# Patient Record
Sex: MALE | Race: WHITE | NOT HISPANIC OR LATINO | Employment: FULL TIME | URBAN - METROPOLITAN AREA
[De-identification: names, ages, dates, MRNs, and addresses within clinical notes are randomized per-mention and may not be internally consistent; named-entity substitution may affect disease eponyms.]

---

## 2017-07-29 ENCOUNTER — HOSPITAL ENCOUNTER (EMERGENCY)
Facility: HOSPITAL | Age: 22
Discharge: HOME | End: 2017-07-30
Attending: EMERGENCY MEDICINE | Admitting: EMERGENCY MEDICINE
Payer: COMMERCIAL

## 2017-07-29 DIAGNOSIS — R45.851 SUICIDAL THOUGHTS: ICD-10-CM

## 2017-07-29 DIAGNOSIS — F19.10 POLYSUBSTANCE ABUSE (HCC): Primary | ICD-10-CM

## 2017-07-29 PROCEDURE — 82075 ASSAY OF BREATH ETHANOL: CPT | Performed by: EMERGENCY MEDICINE

## 2017-07-30 VITALS
TEMPERATURE: 97.4 F | SYSTOLIC BLOOD PRESSURE: 141 MMHG | DIASTOLIC BLOOD PRESSURE: 69 MMHG | RESPIRATION RATE: 18 BRPM | OXYGEN SATURATION: 98 % | WEIGHT: 155 LBS | HEART RATE: 99 BPM

## 2017-07-30 LAB
AMPHETAMINES SERPL QL SCN: NEGATIVE
BARBITURATES UR QL: NEGATIVE
BENZODIAZ UR QL: NEGATIVE
COCAINE UR QL: NEGATIVE
ETHANOL EXG-MCNC: 0 MG/DL
METHADONE UR QL: NEGATIVE
OPIATES UR QL SCN: NEGATIVE
PCP UR QL: NEGATIVE
THC UR QL: NEGATIVE

## 2017-07-30 PROCEDURE — 99284 EMERGENCY DEPT VISIT MOD MDM: CPT

## 2017-07-30 PROCEDURE — 80307 DRUG TEST PRSMV CHEM ANLYZR: CPT | Performed by: EMERGENCY MEDICINE

## 2017-07-30 RX ORDER — NICOTINE 21 MG/24HR
21 PATCH, TRANSDERMAL 24 HOURS TRANSDERMAL ONCE
Status: DISCONTINUED | OUTPATIENT
Start: 2017-07-30 | End: 2017-07-30 | Stop reason: HOSPADM

## 2017-07-30 RX ADMIN — NICOTINE 21 MG: 21 PATCH, EXTENDED RELEASE TRANSDERMAL at 01:10

## 2017-08-21 ENCOUNTER — HOSPITAL ENCOUNTER (EMERGENCY)
Facility: HOSPITAL | Age: 22
Discharge: HOME | End: 2017-08-21
Attending: EMERGENCY MEDICINE
Payer: COMMERCIAL

## 2017-08-21 ENCOUNTER — APPOINTMENT (EMERGENCY)
Dept: RADIOLOGY | Facility: HOSPITAL | Age: 22
End: 2017-08-21
Payer: COMMERCIAL

## 2017-08-21 VITALS
RESPIRATION RATE: 18 BRPM | TEMPERATURE: 98.7 F | HEART RATE: 86 BPM | WEIGHT: 170 LBS | OXYGEN SATURATION: 100 % | SYSTOLIC BLOOD PRESSURE: 116 MMHG | DIASTOLIC BLOOD PRESSURE: 62 MMHG

## 2017-08-21 DIAGNOSIS — M25.569 KNEE PAIN: Primary | ICD-10-CM

## 2017-08-21 DIAGNOSIS — M25.519 SHOULDER PAIN: ICD-10-CM

## 2017-08-21 PROCEDURE — 96372 THER/PROPH/DIAG INJ SC/IM: CPT

## 2017-08-21 PROCEDURE — 90715 TDAP VACCINE 7 YRS/> IM: CPT | Performed by: EMERGENCY MEDICINE

## 2017-08-21 PROCEDURE — 99283 EMERGENCY DEPT VISIT LOW MDM: CPT

## 2017-08-21 PROCEDURE — 73030 X-RAY EXAM OF SHOULDER: CPT | Performed by: EMERGENCY MEDICINE

## 2017-08-21 PROCEDURE — 90471 IMMUNIZATION ADMIN: CPT

## 2017-08-21 PROCEDURE — 73564 X-RAY EXAM KNEE 4 OR MORE: CPT | Performed by: EMERGENCY MEDICINE

## 2017-08-21 RX ORDER — LAMOTRIGINE 100 MG/1
25 TABLET ORAL DAILY
COMMUNITY
End: 2017-08-21

## 2017-08-21 RX ORDER — KETOROLAC TROMETHAMINE 30 MG/ML
15 INJECTION, SOLUTION INTRAMUSCULAR; INTRAVENOUS ONCE
Status: COMPLETED | OUTPATIENT
Start: 2017-08-21 | End: 2017-08-21

## 2017-08-21 RX ORDER — ACETAMINOPHEN 160 MG/5ML
650 SUSPENSION, ORAL (FINAL DOSE FORM) ORAL ONCE
Status: DISCONTINUED | OUTPATIENT
Start: 2017-08-21 | End: 2017-08-21

## 2017-08-21 RX ORDER — ACETAMINOPHEN 325 MG/1
975 TABLET ORAL ONCE
Status: DISCONTINUED | OUTPATIENT
Start: 2017-08-21 | End: 2017-08-21

## 2017-08-21 RX ORDER — ACETAMINOPHEN 325 MG/1
650 TABLET ORAL ONCE
Status: COMPLETED | OUTPATIENT
Start: 2017-08-21 | End: 2017-08-21

## 2017-08-21 RX ORDER — LAMOTRIGINE 25 MG/1
25 TABLET ORAL
COMMUNITY
Start: 2017-08-01 | End: 2019-01-04

## 2017-08-21 RX ORDER — HYDROXYZINE HYDROCHLORIDE 25 MG/1
TABLET, FILM COATED ORAL
COMMUNITY
Start: 2017-08-01 | End: 2018-05-04 | Stop reason: ALTCHOICE

## 2017-08-21 RX ORDER — ACETAMINOPHEN 325 MG/1
TABLET ORAL
Status: COMPLETED
Start: 2017-08-21 | End: 2017-08-21

## 2017-08-21 RX ADMIN — TETANUS TOXOID, REDUCED DIPHTHERIA TOXOID AND ACELLULAR PERTUSSIS VACCINE, ADSORBED 0.5 ML: 5; 2.5; 8; 8; 2.5 SUSPENSION INTRAMUSCULAR at 21:59

## 2017-08-21 RX ADMIN — ACETAMINOPHEN 650 MG: 325 TABLET, FILM COATED ORAL at 20:55

## 2017-08-21 RX ADMIN — ACETAMINOPHEN 650 MG: 325 TABLET ORAL at 20:55

## 2017-08-21 RX ADMIN — KETOROLAC TROMETHAMINE 15 MG: 30 INJECTION, SOLUTION INTRAMUSCULAR at 22:02

## 2017-09-22 ENCOUNTER — HOSPITAL ENCOUNTER (EMERGENCY)
Facility: HOSPITAL | Age: 22
Discharge: HOME | End: 2017-09-22
Attending: EMERGENCY MEDICINE
Payer: COMMERCIAL

## 2017-09-22 VITALS
HEIGHT: 62 IN | HEART RATE: 81 BPM | RESPIRATION RATE: 18 BRPM | SYSTOLIC BLOOD PRESSURE: 131 MMHG | WEIGHT: 170 LBS | TEMPERATURE: 99 F | BODY MASS INDEX: 31.28 KG/M2 | OXYGEN SATURATION: 100 % | DIASTOLIC BLOOD PRESSURE: 68 MMHG

## 2017-09-22 DIAGNOSIS — S39.012A LUMBAR STRAIN, INITIAL ENCOUNTER: Primary | ICD-10-CM

## 2017-09-22 PROCEDURE — 99283 EMERGENCY DEPT VISIT LOW MDM: CPT

## 2017-09-22 RX ORDER — IBUPROFEN 400 MG/1
800 TABLET ORAL ONCE
Status: COMPLETED | OUTPATIENT
Start: 2017-09-22 | End: 2017-09-22

## 2017-09-22 RX ORDER — CYCLOBENZAPRINE HCL 10 MG
10 TABLET ORAL 3 TIMES DAILY
Qty: 30 TABLET | Refills: 0 | Status: SHIPPED | OUTPATIENT
Start: 2017-09-22 | End: 2018-05-04 | Stop reason: ALTCHOICE

## 2017-09-22 RX ADMIN — IBUPROFEN 800 MG: 400 TABLET ORAL at 02:09

## 2018-05-04 ENCOUNTER — APPOINTMENT (EMERGENCY)
Dept: RADIOLOGY | Facility: HOSPITAL | Age: 23
End: 2018-05-04
Payer: COMMERCIAL

## 2018-05-04 ENCOUNTER — HOSPITAL ENCOUNTER (EMERGENCY)
Facility: HOSPITAL | Age: 23
Discharge: HOME | End: 2018-05-04
Payer: COMMERCIAL

## 2018-05-04 VITALS
TEMPERATURE: 98 F | DIASTOLIC BLOOD PRESSURE: 66 MMHG | OXYGEN SATURATION: 97 % | RESPIRATION RATE: 18 BRPM | SYSTOLIC BLOOD PRESSURE: 125 MMHG | BODY MASS INDEX: 32.92 KG/M2 | HEART RATE: 75 BPM | WEIGHT: 180 LBS

## 2018-05-04 DIAGNOSIS — M77.42 METATARSALGIA OF LEFT FOOT: ICD-10-CM

## 2018-05-04 DIAGNOSIS — M72.2 PLANTAR FASCIITIS: Primary | ICD-10-CM

## 2018-05-04 LAB
ANION GAP BLD CALC-SCNC: 14 MMOL/L (ref 4–13)
BUN BLD-MCNC: 17 MG/DL (ref 5–25)
CA-I BLD-SCNC: 1.21 MMOL/L (ref 1.12–1.32)
CHLORIDE BLD-SCNC: 104 MMOL/L (ref 100–108)
CREAT BLD-MCNC: 1.2 MG/DL (ref 0.6–1.3)
GFR SERPL CREATININE-BSD FRML MDRD: 84 ML/MIN/1.73SQ M
GLUCOSE SERPL-MCNC: 66 MG/DL (ref 65–140)
HCT VFR BLD CALC: 41 % (ref 36.5–49.3)
HGB BLDA-MCNC: 13.9 G/DL (ref 12–17)
PCO2 BLD: 28 MMOL/L (ref 21–32)
POTASSIUM BLD-SCNC: 4.4 MMOL/L (ref 3.5–5.3)
SODIUM BLD-SCNC: 141 MMOL/L (ref 136–145)
SPECIMEN SOURCE: ABNORMAL

## 2018-05-04 PROCEDURE — 99283 EMERGENCY DEPT VISIT LOW MDM: CPT

## 2018-05-04 PROCEDURE — 85014 HEMATOCRIT: CPT

## 2018-05-04 PROCEDURE — 73630 X-RAY EXAM OF FOOT: CPT

## 2018-05-04 PROCEDURE — 80047 BASIC METABLC PNL IONIZED CA: CPT

## 2018-05-04 RX ORDER — NAPROXEN 500 MG/1
500 TABLET ORAL 2 TIMES DAILY WITH MEALS
Qty: 20 TABLET | Refills: 0 | Status: SHIPPED | OUTPATIENT
Start: 2018-05-04

## 2018-05-04 RX ORDER — ACETAMINOPHEN 325 MG/1
650 TABLET ORAL ONCE
Status: COMPLETED | OUTPATIENT
Start: 2018-05-04 | End: 2018-05-04

## 2018-05-04 RX ADMIN — ACETAMINOPHEN 650 MG: 325 TABLET ORAL at 13:50

## 2018-05-04 NOTE — ED PROVIDER NOTES
History  Chief Complaint   Patient presents with    Foot Pain     Pt presents to the ED with left foot pain and swelling, denies injury  x 1 week  Leg Pain   Location:  Foot  Injury: no    Foot location:  Sole of L foot  Pain details:     Quality:  Aching and sharp    Radiates to:  Does not radiate    Severity:  Moderate    Onset quality:  Gradual    Duration:  10 days    Timing:  Constant    Progression:  Worsening  Chronicity:  New  Dislocation: no    Prior injury to area:  No  Relieved by:  Nothing  Worsened by: Activity  Ineffective treatments:  Acetaminophen and NSAIDs  Associated symptoms: no back pain, no decreased ROM, no fatigue, no fever, no itching, no muscle weakness, no neck pain, no numbness, no stiffness, no swelling and no tingling        Prior to Admission Medications   Prescriptions Last Dose Informant Patient Reported? Taking?   lamoTRIgine (LaMICtal) 25 mg tablet   Yes No   Sig: Take 25 mg by mouth      Facility-Administered Medications: None       Past Medical History:   Diagnosis Date    ADHD (attention deficit hyperactivity disorder)     [facility] ([facility] )     Suicide attempt ([facility] )        History reviewed  No pertinent surgical history  History reviewed  No pertinent family history  I have reviewed and agree with the history as documented  Social History   Substance Use Topics    Smoking status: Current Every Day Smoker     Packs/day: 0 50    Smokeless tobacco: Never Used    Alcohol use No        Review of Systems   Constitutional: Negative for activity change, appetite change, chills, fatigue and fever  HENT: Negative for ear pain, sneezing and sore throat  Eyes: Negative for pain and visual disturbance  Respiratory: Negative for cough and shortness of breath  Cardiovascular: Negative for chest pain and palpitations  Gastrointestinal: Negative for abdominal pain, blood in stool, constipation, diarrhea, nausea and vomiting     Genitourinary: Negative for dysuria and hematuria  Musculoskeletal: Negative for arthralgias, back pain, neck pain, neck stiffness and stiffness  Skin: Negative for itching, rash and wound  Neurological: Negative for dizziness, weakness, light-headedness, numbness and headaches  All other systems reviewed and are negative  Physical Exam  ED Triage Vitals [05/04/18 1250]   Temperature Pulse Respirations Blood Pressure SpO2   98 °F (36 7 °C) 75 18 125/66 97 %      Temp Source Heart Rate Source Patient Position - Orthostatic VS BP Location FiO2 (%)   Oral Monitor Sitting Left arm --      Pain Score       8           Orthostatic Vital Signs  Vitals:    05/04/18 1250   BP: 125/66   Pulse: 75   Patient Position - Orthostatic VS: Sitting       Physical Exam   Constitutional: He is oriented to person, place, and time  He appears well-developed and well-nourished  No distress  HENT:   Head: Normocephalic and atraumatic  Nose: Nose normal    Eyes: Conjunctivae and EOM are normal  Pupils are equal, round, and reactive to light  Neck: Normal range of motion  Neck supple  Cardiovascular: Normal rate, regular rhythm, normal heart sounds and intact distal pulses  Exam reveals no gallop and no friction rub  No murmur heard  Pulmonary/Chest: Effort normal and breath sounds normal  No respiratory distress  He has no wheezes  He has no rales  Abdominal: Soft  He exhibits no distension  There is no tenderness  Musculoskeletal: Normal range of motion  He exhibits tenderness (along sole of left foot, tenderness most along metatarsal pad  Full AROM of foot  )  He exhibits no edema or deformity  Minor swelling of left foot  Lymphadenopathy:     He has no cervical adenopathy  Neurological: He is alert and oriented to person, place, and time  No cranial nerve deficit or sensory deficit  He exhibits normal muscle tone  Skin: Skin is warm and dry  Capillary refill takes less than 2 seconds  He is not diaphoretic  No erythema  No pallor  Nursing note and vitals reviewed  ED Medications  Medications   acetaminophen (TYLENOL) tablet 650 mg (650 mg Oral Given 5/4/18 1350)       Diagnostic Studies  Results Reviewed     None                 XR foot 3+ views LEFT   Final Result by Arie Etienne MD (05/04 1434)      No acute osseous abnormality  Workstation performed: [id number]                    Procedures  Procedures       Phone Contacts  ED Phone Contact    ED Course                               MDM  Number of Diagnoses or Management Options  Metatarsalgia of left foot: new and requires workup  Plantar fasciitis: new and requires workup  Diagnosis management comments: Patient is a 13-year-old male with no significant history presents emergency department for evaluation of foot pain  Patient states that he has been having pain at the bottom of his left foot  He states that the pain is worse in the morning and does forget severely get better throughout the day  He has been trying Tylenol Motrin without much relief  No numbness or tingling  No fevers, chills, sweats  No known injury  Patient states that he is on his feet often at work and throughout the day  States that his she was rolled he is not have good arch support shoes  He does not use any arch support insult  On exam patient is tender along plantar fascia as well as at the metatarsal pad  Plan will be to treat with NSAIDs, advise patient to get over-the-counter insoles and ultimately follow up with Podiatry for chronic management  Will get Chem 8 to look for signs of kidney dysfunction as patient does have unilateral foot swelling         Amount and/or Complexity of Data Reviewed  Clinical lab tests: ordered and reviewed    Risk of Complications, Morbidity, and/or Mortality  Presenting problems: low  Diagnostic procedures: low  Management options: low    Patient Progress  Patient progress: stable    CritCare Time    Disposition  Final diagnoses:   Plantar fasciitis Metatarsalgia of left foot     Time reflects when diagnosis was documented in both MDM as applicable and the Disposition within this note     Time User Action Codes Description Comment    5/4/2018  2:50 PM Richa Laureano Add [M72 2] Plantar fasciitis     5/4/2018  2:50 PM Rosie Farnsworth Add [M77 42] Metatarsalgia of left foot       ED Disposition     None      Follow-up Information     Follow up With Specialties Details Why Contact Info Additional Information    Arian Means DPM Podiatry Schedule an appointment as soon as possible for a visit in 1 week ED follow up  [address]  [facility] [address] Emergency Department Emergency Medicine  If symptoms worsen [address] [address]  [phone] [facility], [address], [place], [place], [address]        Patient's Medications   Discharge Prescriptions    NAPROXEN (NAPROSYN) 500 MG TABLET    Take 1 tablet (500 mg total) by mouth 2 (two) times a day with meals       Start Date: 5/4/2018  End Date: --       Order Dose: 500 mg       Quantity: 20 tablet    Refills: 0     No discharge procedures on file      ED Provider  Electronically Signed by           Lit Mari PA-C  05/04/18 3139

## 2018-05-04 NOTE — DISCHARGE INSTRUCTIONS
Plantar Fasciitis   WHAT YOU NEED TO KNOW:   Rest, ice, and foot supports can help your plantar fascia heal  You may need medicines to decrease swelling and pain  A physical therapist can teach you exercises to help improve movement and strength, and to decrease pain  DISCHARGE INSTRUCTIONS:   Contact your healthcare provider or podiatrist if:   · Your pain and swelling increase  · You develop knee, hip, or back pain  · You have questions or concerns about your condition or care  Medicines: You may  need any of the following:  · Acetaminophen  decreases pain and fever  It is available without a doctor's order  Ask how much to give your child and how often to give it  Follow directions  Read the labels of all other medicines your child uses to see if they also contain acetaminophen, or ask your child's doctor or pharmacist  Acetaminophen can cause liver damage if not taken correctly  · NSAIDs , such as ibuprofen, help decrease swelling, pain, and fever  NSAIDs can cause stomach bleeding or kidney problems in certain people  If you take blood thinner medicine, always ask your healthcare provider if NSAIDs are safe for you  Always read the medicine label and follow directions  · Take your medicine as directed  Contact your healthcare provider if you think your medicine is not helping or if you have side effects  Tell him of her if you are allergic to any medicine  Keep a list of the medicines, vitamins, and herbs you take  Include the amounts, and when and why you take them  Bring the list or the pill bottles to follow-up visits  Carry your medicine list with you in case of an emergency  Self-care:   · Wear your splint or shoe inserts as directed  You may need to wear a splint at night to keep your foot stretched while you sleep  This will help prevent sharp pain first thing in the morning  Shoe inserts will help decrease stress on your plantar fascia when you walk or exercise       · Rest as directed  Rest as much as possible to decrease swelling and prevent more damage  Ask your healthcare provider when you can return to your normal activities  · Apply ice on your plantar fascia  Ice helps prevent tissue damage and decreases swelling and pain  Fill a water bottle with water and freeze it  Wrap a towel around the bottle or cover it with a pillow case  Roll the water bottle under your foot for 10 minutes in the morning and after work  · Massage your plantar fascia as directed  This may help decrease swelling and pain  Roll a golf ball under your foot for 10 minutes  Repeat 3 times each day  · Go to physical therapy as directed  A physical therapist teaches you exercises to help improve movement and strength, and to decrease pain  Prevent plantar fasciitis:   · Maintain a healthy weight  This will help decrease stress on your feet  Ask your healthcare provider how much you should weigh  Ask him to help you create a weight loss plan if you are overweight  · Do low-impact exercises  Low-impact exercises decrease stress on your plantar fascia  Examples include swimming or bicycling  · Start new activities slowly  Increase the intensity and time gradually  · Wear shoes that fit well and support your arch  Replace your shoes before the padding or shock absorption wears out  Do not walk or  bare feet or sandals for long periods of time  · Stretch before you exercise  Ask your healthcare provider how to stretch your plantar fascia and calf muscles  Follow up with your healthcare provider or podiatrist as directed:  Write down your questions so you remember to ask them during your visits  © 2017 [address] [employer] Information is for End User's use only and may not be sold, redistributed or otherwise used for commercial purposes  All illustrations and images included in CareNotes® are the copyrighted property of A [employer] M [employer]  or [employer]    The above information is an  only  It is not intended as medical advice for individual conditions or treatments  Talk to your doctor, nurse or pharmacist before following any medical regimen to see if it is safe and effective for you  Metatarsalgia   WHAT YOU NEED TO KNOW:   Metatarsalgia is pain in the ball of your foot, near your second, third, and fourth toes  DISCHARGE INSTRUCTIONS:   Contact your healthcare provider if:   · You develop knee, back, or hip pain  · You have more pain or redness in the foot  · You have questions or concerns about your condition or care  Medicines:   · NSAIDs , such as ibuprofen, help decrease swelling, pain, and fever  This medicine is available with or without a doctor's order  NSAIDs can cause stomach bleeding or kidney problems in certain people  If you take blood thinner medicine, always ask if NSAIDs are safe for you  Always read the medicine label and follow directions  Do not give these medicines to children under 10months of age without direction from your child's healthcare provider  · Take your medicine as directed  Contact your healthcare provider if you think your medicine is not helping or if you have side effects  Tell him or her if you are allergic to any medicine  Keep a list of the medicines, vitamins, and herbs you take  Include the amounts, and when and why you take them  Bring the list or the pill bottles to follow-up visits  Carry your medicine list with you in case of an emergency  Manage or prevent metatarsalgia:   · Rest your foot  If you play sports, you may not be able to do weight-bearing exercises  Examples include swimming and bike riding  Ask your healthcare provider which exercises are safe for you  · Apply ice as directed  Ice helps reduce pain and swelling  Use an ice pack, or put crushed ice in a plastic bag  Cover the pack or bag with a towel before you apply it to your foot   Apply ice for 15 to 20 minutes every hour, or as directed  · Use a cane or crutch if directed  These devices may help take pressure off your foot while it heals  · Wear proper shoes  Do not wear shoes that are narrow or tight  You may need to wear shoes that are wider than you usually wear  Choose shoes that do not have a raised heel  Shock-absorbing shoes can help prevent injury  These shoes will have extra support under your feet and toes  You can also add shoe cushions inside your shoes or to the bottoms of your feet, near your toes  The cushions may provide more support and make walking or standing more comfortable  Arch supports may help take pressure off your toes  · Reach or maintain a healthy weight  Extra weight can put pressure on your feet  Talk to your healthcare provider about a healthy weight for you  Your provider can help you create a safe weight loss plan if you are overweight  · Go to physical therapy if directed  A physical therapist can help improve your strength and range of motion  The therapist can also help you improve the way you walk to prevent metatarsalgia from happening again  Your therapist can also teach you exercises to help relieve your pain  Follow up with your healthcare provider as directed:  Write down your questions so you remember to ask them during your visits  © 2017 [employer]  Information is for End User's use only and may not be sold, redistributed or otherwise used for commercial purposes  All illustrations and images included in CareNotes® are the copyrighted property of [employer]  or Steve Monroe  The above information is an  only  It is not intended as medical advice for individual conditions or treatments  Talk to your doctor, nurse or pharmacist before following any medical regimen to see if it is safe and effective for you

## 2018-07-21 ENCOUNTER — HOSPITAL ENCOUNTER (EMERGENCY)
Facility: HOSPITAL | Age: 23
Discharge: HOME | End: 2018-07-21
Attending: EMERGENCY MEDICINE | Admitting: EMERGENCY MEDICINE
Payer: COMMERCIAL

## 2018-07-21 ENCOUNTER — APPOINTMENT (EMERGENCY)
Dept: RADIOLOGY | Facility: HOSPITAL | Age: 23
End: 2018-07-21
Payer: COMMERCIAL

## 2018-07-21 VITALS
HEART RATE: 85 BPM | OXYGEN SATURATION: 99 % | TEMPERATURE: 98.8 F | WEIGHT: 171.3 LBS | RESPIRATION RATE: 20 BRPM | SYSTOLIC BLOOD PRESSURE: 147 MMHG | DIASTOLIC BLOOD PRESSURE: 72 MMHG | BODY MASS INDEX: 31.33 KG/M2

## 2018-07-21 DIAGNOSIS — L03.115 CELLULITIS OF RIGHT FOOT: Primary | ICD-10-CM

## 2018-07-21 PROCEDURE — 90715 TDAP VACCINE 7 YRS/> IM: CPT | Performed by: EMERGENCY MEDICINE

## 2018-07-21 PROCEDURE — 90471 IMMUNIZATION ADMIN: CPT

## 2018-07-21 PROCEDURE — 99284 EMERGENCY DEPT VISIT MOD MDM: CPT

## 2018-07-21 PROCEDURE — 73630 X-RAY EXAM OF FOOT: CPT

## 2018-07-21 RX ORDER — SULFAMETHOXAZOLE AND TRIMETHOPRIM 800; 160 MG/1; MG/1
1 TABLET ORAL ONCE
Status: COMPLETED | OUTPATIENT
Start: 2018-07-21 | End: 2018-07-21

## 2018-07-21 RX ORDER — SULFAMETHOXAZOLE AND TRIMETHOPRIM 800; 160 MG/1; MG/1
1 TABLET ORAL 2 TIMES DAILY
Qty: 14 TABLET | Refills: 0 | Status: SHIPPED | OUTPATIENT
Start: 2018-07-21 | End: 2018-07-28

## 2018-07-21 RX ORDER — IBUPROFEN 400 MG/1
800 TABLET ORAL ONCE
Status: COMPLETED | OUTPATIENT
Start: 2018-07-21 | End: 2018-07-21

## 2018-07-21 RX ORDER — CEPHALEXIN 250 MG/1
500 CAPSULE ORAL ONCE
Status: COMPLETED | OUTPATIENT
Start: 2018-07-21 | End: 2018-07-21

## 2018-07-21 RX ORDER — CEPHALEXIN 500 MG/1
500 CAPSULE ORAL 4 TIMES DAILY
Qty: 28 CAPSULE | Refills: 0 | Status: SHIPPED | OUTPATIENT
Start: 2018-07-21 | End: 2018-07-28

## 2018-07-21 RX ADMIN — IBUPROFEN 800 MG: 400 TABLET ORAL at 02:33

## 2018-07-21 RX ADMIN — TETANUS TOXOID, REDUCED DIPHTHERIA TOXOID AND ACELLULAR PERTUSSIS VACCINE, ADSORBED 0.5 ML: 5; 2.5; 8; 8; 2.5 SUSPENSION INTRAMUSCULAR at 02:34

## 2018-07-21 RX ADMIN — SULFAMETHOXAZOLE AND TRIMETHOPRIM 1 TABLET: 800; 160 TABLET ORAL at 02:32

## 2018-07-21 RX ADMIN — CEPHALEXIN 500 MG: 250 CAPSULE ORAL at 02:32

## 2018-07-21 NOTE — ED PROVIDER NOTES
History  Chief Complaint   Patient presents with    Foot Pain     per pt  "he woke up in the middle of his sleep with some cellulities on top of his right foot, pt states he does not remeber hitting his foot on anything  pt denies taking any pain meds prior to ed arrival "     Patient is a 20year old male with pain and redness tonight of R foot  ?last Td  No fever  No known trauma  Works at [employer] in [facility]  Does not remember burning his R foot  Has been scratching his R foot  Was last seen in this ED on 5/4/18 for plantar fasciitis  [facility] website checked on this patient and no Rx found  States he is a recovering addict and does not take narcotic pain medications now  Did not take anything for pain at home prior to arrival          History provided by:  Patient and EMS personnel   used: No        Prior to Admission Medications   Prescriptions Last Dose Informant Patient Reported? Taking?   lamoTRIgine (LaMICtal) 25 mg tablet   Yes Yes   Sig: Take 25 mg by mouth   naproxen (NAPROSYN) 500 mg tablet   No Yes   Sig: Take 1 tablet (500 mg total) by mouth 2 (two) times a day with meals      Facility-Administered Medications: None       Past Medical History:   Diagnosis Date    ADHD (attention deficit hyperactivity disorder)     [facility] )     [facility] )        History reviewed  No pertinent surgical history  History reviewed  No pertinent family history  I have reviewed and agree with the history as documented  Social History   Substance Use Topics    Smoking status: Current Every Day Smoker     Packs/day: 0 50    Smokeless tobacco: Never Used    Alcohol use No        Review of Systems   Constitutional: Negative for fever  Musculoskeletal:        R foot pain     Skin: Positive for color change (R foot)  Physical Exam  Physical Exam   Constitutional: He appears well-developed and well-nourished  He appears distressed (mild)     Musculoskeletal: He exhibits edema (mild R foot) and tenderness (anterior R foot tenderness  Rest of R LE nontender  NVI  )  He exhibits no deformity  Neurological: He is alert  Skin: Skin is warm and dry  There is erythema (of R dorsal foot with excoriations noted  )  No lymphangitis noted  Nursing note and vitals reviewed  Vital Signs  ED Triage Vitals [07/21/18 0207]   Temperature Pulse Respirations Blood Pressure SpO2   98 8 °F (37 1 °C) 85 20 147/72 99 %      Temp Source Heart Rate Source Patient Position - Orthostatic VS BP Location FiO2 (%)   Oral Monitor Lying Right arm --      Pain Score       7           Vitals:    07/21/18 0207   BP: 147/72   Pulse: 85   Patient Position - Orthostatic VS: Lying       Visual Acuity      ED Medications  Medications   tetanus-diphtheria-acellular pertussis (BOOSTRIX) IM injection 0 5 mL (0 5 mL Intramuscular Given 7/21/18 0234)   ibuprofen (MOTRIN) tablet 800 mg (800 mg Oral Given 7/21/18 0233)   cephalexin (KEFLEX) capsule 500 mg (500 mg Oral Given 7/21/18 0232)   sulfamethoxazole-trimethoprim (BACTRIM DS) 800-160 mg per tablet 1 tablet (1 tablet Oral Given 7/21/18 0232)       Diagnostic Studies  Results Reviewed     None                 XR foot 3+ views RIGHT   ED Interpretation by Charis Moy MD (07/21 0250)   No bony abnormality read by me  Procedures  Procedures       Phone Contacts  ED Phone Contact    ED Course  ED Course as of Jul 21 0255   Sat Jul 21, 2018   0250 X-ray d/w patient  Patient has naproxen at home  MDM  Number of Diagnoses or Management Options  Diagnosis management comments: DDX including but not limited to: cellulitis, doubt osteomyelitis, folliculitis, carbuncle, doubt abscess; doubt rhabdomyolysis, necrotizing fasciitis, allergic reaction, angioedema, DVT or burn          Amount and/or Complexity of Data Reviewed  Tests in the radiology section of CPT®: ordered and reviewed  Decide to obtain previous medical records or to obtain history from someone other than the patient: yes  Review and summarize past medical records: yes  Independent visualization of images, tracings, or specimens: yes      CritCare Time    Disposition  Final diagnoses:   Cellulitis of right foot     Time reflects when diagnosis was documented in both MDM as applicable and the Disposition within this note     Time User Action Codes Description Comment    7/21/2018  2:53 AM Christiano Ulises Gregorio [L03 115] Cellulitis of right foot       ED Disposition     ED Disposition Condition Comment    Discharge  V[place] Kelly discharge to home/self care  Condition at discharge: Stable        Follow-up Information     Follow up With Specialties Details Why [address] [place] [address] Internal Medicine Call in 2 days for recheck  Elevate  take naproxen for pain at home  Return sooner if increased pain, swelling, fever, red streaks, worsening redness, numbness, weakness  [address] [place] [address]  [phone]            Patient's Medications   Discharge Prescriptions    CEPHALEXIN (KEFLEX) 500 MG CAPSULE    Take 1 capsule (500 mg total) by mouth 4 (four) times a day for 7 days       Start Date: 7/21/2018 End Date: 7/28/2018       Order Dose: 500 mg       Quantity: 28 capsule    Refills: 0    SULFAMETHOXAZOLE-TRIMETHOPRIM (BACTRIM DS) 800-160 MG PER TABLET    Take 1 tablet by mouth 2 (two) times a day for 7 days       Start Date: 7/21/2018 End Date: 7/28/2018       Order Dose: 1 tablet       Quantity: 14 tablet    Refills: 0     No discharge procedures on file      ED Provider  Electronically Signed by           Beverley Vasquez MD  07/21/18 3777

## 2018-07-21 NOTE — DISCHARGE INSTRUCTIONS
Cellulitis   WHAT YOU NEED TO KNOW:   Cellulitis is a skin infection caused by bacteria  Cellulitis may go away on its own or you may need treatment  Your healthcare provider may draw a [place] around the outside edges of your cellulitis  If your cellulitis spreads, your healthcare provider will see it outside of the [place]  DISCHARGE INSTRUCTIONS:   Call 911 if:   · You have sudden trouble breathing or chest pain  Return to the emergency department if:   · Your wound gets larger and more painful  · You feel a crackling under your skin when you touch it  · You have purple dots or bumps on your skin, or you see bleeding under your skin  · You have new swelling and pain in your legs  · The red, warm, swollen area gets larger  · You see red streaks coming from the infected area  Contact your healthcare provider if:   · You have a fever  · Your fever or pain does not go away or gets worse  · The area does not get smaller after 2 days of antibiotics  · Your skin is flaking or peeling off  · You have questions or concerns about your condition or care  Medicines:   · Antibiotics  help treat the bacterial infection  · NSAIDs , such as ibuprofen, help decrease swelling, pain, and fever  NSAIDs can cause stomach bleeding or kidney problems in certain people  If you take blood thinner medicine, always ask if NSAIDs are safe for you  Always read the medicine label and follow directions  Do not give these medicines to children under 10months of age without direction from your child's healthcare provider  · Acetaminophen  decreases pain and fever  It is available without a doctor's order  Ask how much to take and how often to take it  Follow directions  Read the labels of all other medicines you are using to see if they also contain acetaminophen, or ask your doctor or pharmacist  Acetaminophen can cause liver damage if not taken correctly   Do not use more than 4 grams (4,000 milligrams) total of acetaminophen in one day  · Take your medicine as directed  Contact your healthcare provider if you think your medicine is not helping or if you have side effects  Tell him or her if you are allergic to any medicine  Keep a list of the medicines, vitamins, and herbs you take  Include the amounts, and when and why you take them  Bring the list or the pill bottles to follow-up visits  Carry your medicine list with you in case of an emergency  Self-care:   · Elevate the area above the level of your heart  as often as you can  This will help decrease swelling and pain  Prop the area on pillows or blankets to keep it elevated comfortably  · Clean the area daily until the wound scabs over  Gently wash the area with soap and water  Pat dry  Use dressings as directed  · Place cool or warm, wet cloths on the area as directed  Use clean cloths and clean water  Leave it on the area until the cloth is room temperature  Pat the area dry with a clean, dry cloth  The cloths may help decrease pain  Prevent cellulitis:   · Do not scratch bug bites or areas of injury  You increase your risk for cellulitis by scratching these areas  · Do not share personal items, such as towels, clothing, and razors  · Clean exercise equipment  with germ-killing  before and after you use it  · Wash your hands often  Use soap and water  Wash your hands after you use the bathroom, change a child's diapers, or sneeze  Wash your hands before you prepare or eat food  Use lotion to prevent dry, cracked skin  · Wear pressure stockings as directed  You may be told to wear the stockings if you have peripheral edema  The stockings improve blood flow and decrease swelling  · Treat athlete's foot  This can help prevent the spread of a bacterial skin infection  Follow up with your healthcare provider within 3 days, or as directed:   Your healthcare provider will check if your cellulitis is getting better  You may need different medicine  Write down your questions so you remember to ask them during your visits  © 2017 [facility] Information is for End User's use only and may not be sold, redistributed or otherwise used for commercial purposes  All illustrations and images included in CareNotes® are the copyrighted property of A D A M [employer]  or Steve Monroe  The above information is an  only  It is not intended as medical advice for individual conditions or treatments  Talk to your doctor, nurse or pharmacist before following any medical regimen to see if it is safe and effective for you  Cellulitis   WHAT YOU NEED TO KNOW:   Cellulitis is a skin infection caused by bacteria  Cellulitis may go away on its own or you may need treatment  Your healthcare provider may draw a [place] around the outside edges of your cellulitis  If your cellulitis spreads, your healthcare provider will see it outside of the [place]  DISCHARGE INSTRUCTIONS:   Call 911 if:   · You have sudden trouble breathing or chest pain  Return to the emergency department if:   · Your wound gets larger and more painful  · You feel a crackling under your skin when you touch it  · You have purple dots or bumps on your skin, or you see bleeding under your skin  · You have new swelling and pain in your legs  · The red, warm, swollen area gets larger  · You see red streaks coming from the infected area  Contact your healthcare provider if:   · You have a fever  · Your fever or pain does not go away or gets worse  · The area does not get smaller after 2 days of antibiotics  · Your skin is flaking or peeling off  · You have questions or concerns about your condition or care  Medicines:   · Antibiotics  help treat the bacterial infection  · NSAIDs , such as ibuprofen, help decrease swelling, pain, and fever   NSAIDs can cause stomach bleeding or kidney problems in certain people  If you take blood thinner medicine, always ask if NSAIDs are safe for you  Always read the medicine label and follow directions  Do not give these medicines to children under 10months of age without direction from your child's healthcare provider  · Acetaminophen  decreases pain and fever  It is available without a doctor's order  Ask how much to take and how often to take it  Follow directions  Read the labels of all other medicines you are using to see if they also contain acetaminophen, or ask your doctor or pharmacist  Acetaminophen can cause liver damage if not taken correctly  Do not use more than 4 grams (4,000 milligrams) total of acetaminophen in one day  · Take your medicine as directed  Contact your healthcare provider if you think your medicine is not helping or if you have side effects  Tell him or her if you are allergic to any medicine  Keep a list of the medicines, vitamins, and herbs you take  Include the amounts, and when and why you take them  Bring the list or the pill bottles to follow-up visits  Carry your medicine list with you in case of an emergency  Self-care:   · Elevate the area above the level of your heart  as often as you can  This will help decrease swelling and pain  Prop the area on pillows or blankets to keep it elevated comfortably  · Clean the area daily until the wound scabs over  Gently wash the area with soap and water  Pat dry  Use dressings as directed  · Place cool or warm, wet cloths on the area as directed  Use clean cloths and clean water  Leave it on the area until the cloth is room temperature  Pat the area dry with a clean, dry cloth  The cloths may help decrease pain  Prevent cellulitis:   · Do not scratch bug bites or areas of injury  You increase your risk for cellulitis by scratching these areas  · Do not share personal items, such as towels, clothing, and razors       · Clean exercise equipment  with germ-killing  before and after you use it  · Wash your hands often  Use soap and water  Wash your hands after you use the bathroom, change a child's diapers, or sneeze  Wash your hands before you prepare or eat food  Use lotion to prevent dry, cracked skin  · Wear pressure stockings as directed  You may be told to wear the stockings if you have peripheral edema  The stockings improve blood flow and decrease swelling  · Treat athlete's foot  This can help prevent the spread of a bacterial skin infection  Follow up with your healthcare provider within 3 days, or as directed: Your healthcare provider will check if your cellulitis is getting better  You may need different medicine  Write down your questions so you remember to ask them during your visits  © 2017 [facility] Information is for End User's use only and may not be sold, redistributed or otherwise used for commercial purposes  All illustrations and images included in CareNotes® are the copyrighted property of A D A M , Inc  or Steve Monroe  The above information is an  only  It is not intended as medical advice for individual conditions or treatments  Talk to your doctor, nurse or pharmacist before following any medical regimen to see if it is safe and effective for you

## 2018-07-31 ENCOUNTER — HOSPITAL ENCOUNTER (EMERGENCY)
Facility: HOSPITAL | Age: 23
Discharge: HOME | End: 2018-07-31
Attending: EMERGENCY MEDICINE | Admitting: EMERGENCY MEDICINE
Payer: COMMERCIAL

## 2018-07-31 ENCOUNTER — APPOINTMENT (EMERGENCY)
Dept: RADIOLOGY | Facility: HOSPITAL | Age: 23
End: 2018-07-31
Payer: COMMERCIAL

## 2018-07-31 VITALS
RESPIRATION RATE: 18 BRPM | DIASTOLIC BLOOD PRESSURE: 78 MMHG | TEMPERATURE: 98.1 F | OXYGEN SATURATION: 98 % | HEART RATE: 100 BPM | SYSTOLIC BLOOD PRESSURE: 131 MMHG

## 2018-07-31 DIAGNOSIS — R07.9 CHEST PAIN: Primary | ICD-10-CM

## 2018-07-31 DIAGNOSIS — Z72.0 TOBACCO ABUSE: ICD-10-CM

## 2018-07-31 LAB
ALBUMIN SERPL BCP-MCNC: 3.7 G/DL (ref 3.5–5)
ALP SERPL-CCNC: 147 U/L (ref 46–116)
ALT SERPL W P-5'-P-CCNC: 47 U/L (ref 12–78)
ANION GAP SERPL CALCULATED.3IONS-SCNC: 9 MMOL/L (ref 4–13)
AST SERPL W P-5'-P-CCNC: 24 U/L (ref 5–45)
BASOPHILS # BLD AUTO: 0.05 THOUSANDS/ΜL (ref 0–0.1)
BASOPHILS NFR BLD AUTO: 0 % (ref 0–1)
BILIRUB SERPL-MCNC: 0.5 MG/DL (ref 0.2–1)
BUN SERPL-MCNC: 12 MG/DL (ref 5–25)
CALCIUM SERPL-MCNC: 9.4 MG/DL (ref 8.3–10.1)
CHLORIDE SERPL-SCNC: 102 MMOL/L (ref 100–108)
CO2 SERPL-SCNC: 26 MMOL/L (ref 21–32)
CREAT SERPL-MCNC: 1 MG/DL (ref 0.6–1.3)
DEPRECATED D DIMER PPP: 410 NG/ML (FEU) (ref 0–424)
EOSINOPHIL # BLD AUTO: 0.13 THOUSAND/ΜL (ref 0–0.61)
EOSINOPHIL NFR BLD AUTO: 1 % (ref 0–6)
ERYTHROCYTE [DISTWIDTH] IN BLOOD BY AUTOMATED COUNT: 14 % (ref 11.6–15.1)
GFR SERPL CREATININE-BSD FRML MDRD: 104 ML/MIN/1.73SQ M
GLUCOSE SERPL-MCNC: 87 MG/DL (ref 65–140)
HCT VFR BLD AUTO: 39.1 % (ref 36.5–49.3)
HGB BLD-MCNC: 12.7 G/DL (ref 12–17)
IMM GRANULOCYTES # BLD AUTO: 0.03 THOUSAND/UL (ref 0–0.2)
IMM GRANULOCYTES NFR BLD AUTO: 0 % (ref 0–2)
LIPASE SERPL-CCNC: 140 U/L (ref 73–393)
LYMPHOCYTES # BLD AUTO: 1.55 THOUSANDS/ΜL (ref 0.6–4.47)
LYMPHOCYTES NFR BLD AUTO: 14 % (ref 14–44)
MCH RBC QN AUTO: 27.7 PG (ref 26.8–34.3)
MCHC RBC AUTO-ENTMCNC: 32.5 G/DL (ref 31.4–37.4)
MCV RBC AUTO: 85 FL (ref 82–98)
MONOCYTES # BLD AUTO: 0.86 THOUSAND/ΜL (ref 0.17–1.22)
MONOCYTES NFR BLD AUTO: 8 % (ref 4–12)
NEUTROPHILS # BLD AUTO: 8.57 THOUSANDS/ΜL (ref 1.85–7.62)
NEUTS SEG NFR BLD AUTO: 77 % (ref 43–75)
NRBC BLD AUTO-RTO: 0 /100 WBCS
PLATELET # BLD AUTO: 394 THOUSANDS/UL (ref 149–390)
PMV BLD AUTO: 10.8 FL (ref 8.9–12.7)
POTASSIUM SERPL-SCNC: 4.3 MMOL/L (ref 3.5–5.3)
PROT SERPL-MCNC: 8.3 G/DL (ref 6.4–8.2)
RBC # BLD AUTO: 4.58 MILLION/UL (ref 3.88–5.62)
SODIUM SERPL-SCNC: 137 MMOL/L (ref 136–145)
TROPONIN I SERPL-MCNC: <0.02 NG/ML
WBC # BLD AUTO: 11.19 THOUSAND/UL (ref 4.31–10.16)

## 2018-07-31 PROCEDURE — 96361 HYDRATE IV INFUSION ADD-ON: CPT

## 2018-07-31 PROCEDURE — 85379 FIBRIN DEGRADATION QUANT: CPT | Performed by: EMERGENCY MEDICINE

## 2018-07-31 PROCEDURE — 36415 COLL VENOUS BLD VENIPUNCTURE: CPT | Performed by: EMERGENCY MEDICINE

## 2018-07-31 PROCEDURE — 84484 ASSAY OF TROPONIN QUANT: CPT | Performed by: EMERGENCY MEDICINE

## 2018-07-31 PROCEDURE — 93005 ELECTROCARDIOGRAM TRACING: CPT

## 2018-07-31 PROCEDURE — 99285 EMERGENCY DEPT VISIT HI MDM: CPT

## 2018-07-31 PROCEDURE — 71046 X-RAY EXAM CHEST 2 VIEWS: CPT

## 2018-07-31 PROCEDURE — 96375 TX/PRO/DX INJ NEW DRUG ADDON: CPT

## 2018-07-31 PROCEDURE — 80053 COMPREHEN METABOLIC PANEL: CPT | Performed by: EMERGENCY MEDICINE

## 2018-07-31 PROCEDURE — 83690 ASSAY OF LIPASE: CPT | Performed by: EMERGENCY MEDICINE

## 2018-07-31 PROCEDURE — 85025 COMPLETE CBC W/AUTO DIFF WBC: CPT | Performed by: EMERGENCY MEDICINE

## 2018-07-31 PROCEDURE — 96374 THER/PROPH/DIAG INJ IV PUSH: CPT

## 2018-07-31 RX ORDER — ACETAMINOPHEN 325 MG/1
650 TABLET ORAL ONCE
Status: COMPLETED | OUTPATIENT
Start: 2018-07-31 | End: 2018-07-31

## 2018-07-31 RX ORDER — DEXAMETHASONE SODIUM PHOSPHATE 10 MG/ML
10 INJECTION, SOLUTION INTRAMUSCULAR; INTRAVENOUS ONCE
Status: COMPLETED | OUTPATIENT
Start: 2018-07-31 | End: 2018-07-31

## 2018-07-31 RX ORDER — ACETAMINOPHEN 500 MG
500 TABLET ORAL EVERY 6 HOURS PRN
Qty: 30 TABLET | Refills: 0 | Status: SHIPPED | OUTPATIENT
Start: 2018-07-31 | End: 2019-01-04

## 2018-07-31 RX ORDER — KETOROLAC TROMETHAMINE 30 MG/ML
15 INJECTION, SOLUTION INTRAMUSCULAR; INTRAVENOUS ONCE
Status: COMPLETED | OUTPATIENT
Start: 2018-07-31 | End: 2018-07-31

## 2018-07-31 RX ORDER — NAPROXEN 500 MG/1
500 TABLET ORAL 2 TIMES DAILY WITH MEALS
Qty: 30 TABLET | Refills: 0 | Status: SHIPPED | OUTPATIENT
Start: 2018-07-31 | End: 2019-01-04

## 2018-07-31 RX ADMIN — DEXAMETHASONE SODIUM PHOSPHATE 10 MG: 10 INJECTION, SOLUTION INTRAMUSCULAR; INTRAVENOUS at 18:10

## 2018-07-31 RX ADMIN — KETOROLAC TROMETHAMINE 15 MG: 30 INJECTION, SOLUTION INTRAMUSCULAR at 18:10

## 2018-07-31 RX ADMIN — ACETAMINOPHEN 650 MG: 325 TABLET, FILM COATED ORAL at 18:10

## 2018-07-31 RX ADMIN — SODIUM CHLORIDE 1000 ML: 0.9 INJECTION, SOLUTION INTRAVENOUS at 18:13

## 2018-07-31 NOTE — ED PROVIDER NOTES
History  Chief Complaint   Patient presents with    Shortness of Breath     PT reports "started yesterday with not being able to breathe, right lung feels worse than left, can't laugh, sneeze, cough, take a deep breath without any pain" PT c/o stabbing pain when breathing     HPI  39-year-old male presents with shortness of breath as well as chest pain that began yesterday  Patient describes a pleuritic type pain that is most noticeable on the right side of his chest   He says it waxes and wanes intensity otherwise has no exacerbating or alleviating factors  Pain is not positional it is not worse with exertion and there is no associated diaphoresis  Patient denies any cough, hemoptysis  No fevers, chills, nausea, vomiting, diarrhea  Patient says he has never had a pain like this before  Denies any recent injury or any recent illnesses  Patient has a history of epilepsy that is well controlled on Lamictal   No recent changes to medication  Patient says he also smokes roughly 2 packs a day denies any other alcohol or drug use  Twelve systems reviewed otherwise negative except as stated in HPI  Impression and plan 39-year-old male presents with right-sided chest pain or shortness of breath that began yesterday  He is slightly tachycardic otherwise well-appearing normal lung sounds  I will do cardiac evaluation as well as check a D-dimer as patient is low risk for PE based on Wells criteria  I will treat symptomatic we and reassess  He is also a low heart score and given that his chest pain has been present since yesterday I will check a single troponin  Prior to Admission Medications   Prescriptions Last Dose Informant Patient Reported?  Taking?   lamoTRIgine (LaMICtal) 25 mg tablet   Yes Yes   Sig: Take 25 mg by mouth   naproxen (NAPROSYN) 500 mg tablet   No Yes   Sig: Take 1 tablet (500 mg total) by mouth 2 (two) times a day with meals      Facility-Administered Medications: None       Past Medical History:   Diagnosis Date    ADHD (attention deficit hyperactivity disorder)     Ep[facility] ([facility] [address] )     Suicide attempt ([facility] )        History reviewed  No pertinent surgical history  History reviewed  No pertinent family history  I have reviewed and agree with the history as documented  Social History   Substance Use Topics    Smoking status: Current Every Day Smoker     Packs/day: 0 50    Smokeless tobacco: Never Used    Alcohol use No        Review of Systems   Constitutional: Negative for chills and fever  HENT: Negative for trouble swallowing and voice change  Eyes: Negative for photophobia  Respiratory: Positive for chest tightness and shortness of breath  Negative for apnea, cough, choking, wheezing and stridor  Cardiovascular: Positive for chest pain  Negative for palpitations and leg swelling  Gastrointestinal: Negative for abdominal pain, diarrhea, nausea and vomiting  Endocrine: Negative for polyuria  Genitourinary: Negative for dysuria  Musculoskeletal: Negative for back pain  Skin: Negative for rash  Allergic/Immunologic: Negative  Neurological: Negative for dizziness, syncope, weakness, numbness and headaches  Psychiatric/Behavioral: Negative for agitation  Physical Exam  Physical Exam   Constitutional: He is oriented to person, place, and time  He appears well-developed and well-nourished  No distress  HENT:   Head: Normocephalic and atraumatic  Right Ear: No hemotympanum  Left Ear: No hemotympanum  Nose: No nasal septal hematoma  Mouth/Throat: Uvula is midline and oropharynx is clear and moist  No oropharyngeal exudate  Eyes: EOM are normal  Pupils are equal, round, and reactive to light  Right eye exhibits no discharge  Left eye exhibits no discharge  No scleral icterus  Neck: Normal range of motion  Neck supple  No JVD present  No tracheal deviation present  No thyromegaly present     Cardiovascular: Normal rate, regular rhythm, normal heart sounds and intact distal pulses  Exam reveals no gallop and no friction rub  No murmur heard  Pulmonary/Chest: Effort normal and breath sounds normal  No stridor  No respiratory distress  He has no wheezes  He has no rales  He exhibits no tenderness  Abdominal: Soft  Bowel sounds are normal  He exhibits no distension and no mass  There is no tenderness  There is no rebound and no guarding  No hernia  Musculoskeletal: Normal range of motion  He exhibits no edema  Lymphadenopathy:     He has no cervical adenopathy  Neurological: He is alert and oriented to person, place, and time  He has normal strength and normal reflexes  He is not disoriented  No cranial nerve deficit or sensory deficit  GCS eye subscore is 4  GCS verbal subscore is 5  GCS motor subscore is 6  Reflex Scores:       Patellar reflexes are 2+ on the right side and 2+ on the left side  Achilles reflexes are 2+ on the right side and 2+ on the left side  Cn 2-12 grossly intact  No pronator drift  Normal gait  Normal strength/sensation   Skin: Skin is warm and dry  Capillary refill takes less than 2 seconds  He is not diaphoretic  No erythema  Psychiatric: He has a normal mood and affect         Vital Signs  ED Triage Vitals   Temperature Pulse Respirations Blood Pressure SpO2   07/31/18 1735 07/31/18 1730 07/31/18 1735 07/31/18 1730 07/31/18 1730   98 1 °F (36 7 °C) 100 18 131/78 98 %      Temp src Heart Rate Source Patient Position - Orthostatic VS BP Location FiO2 (%)   -- -- -- -- --             Pain Score       07/31/18 1810       Worst Possible Pain           Vitals:    07/31/18 1730   BP: 131/78   Pulse: 100       Visual Acuity      ED Medications  Medications   sodium chloride 0 9 % bolus 1,000 mL (0 mL Intravenous Stopped 7/31/18 1924)   ketorolac (TORADOL) injection 15 mg (15 mg Intravenous Given 7/31/18 1810)   acetaminophen (TYLENOL) tablet 650 mg (650 mg Oral Given 7/31/18 1810)   dexamethasone (PF) (DECADRON) injection 10 mg (10 mg Intravenous Given 7/31/18 1810)       Diagnostic Studies  Results Reviewed     Procedure Component Value Units Date/Time    Troponin I [[id number]]  (Normal) Collected:  07/31/18 1801    Lab Status:  Final result Specimen:  Blood from Arm, Right Updated:  07/31/18 1829     Troponin I <0 02 ng/mL     Comprehensive metabolic panel [[id number]]  (Abnormal) Collected:  07/31/18 1801    Lab Status:  Final result Specimen:  Blood from Arm, Right Updated:  07/31/18 1827     Sodium 137 mmol/L      Potassium 4 3 mmol/L      Chloride 102 mmol/L      CO2 26 mmol/L      Anion Gap 9 mmol/L      BUN 12 mg/dL      Creatinine 1 00 mg/dL      Glucose 87 mg/dL      Calcium 9 4 mg/dL      AST 24 U/L      ALT 47 U/L      Alkaline Phosphatase 147 (H) U/L      Total Protein 8 3 (H) g/dL      Albumin 3 7 g/dL      Total Bilirubin 0 50 mg/dL      eGFR 104 ml/min/1 73sq m     Narrative:         National Kidney Disease Education Program recommendations are as follows:  GFR calculation is accurate only with a steady state creatinine  Chronic Kidney disease less than 60 ml/min/1 73 sq  meters  Kidney failure less than 15 ml/min/1 73 sq  meters      Lipase [[id number]]  (Normal) Collected:  07/31/18 1801    Lab Status:  Final result Specimen:  Blood from Arm, Right Updated:  07/31/18 1827     Lipase 140 u/L     D-Dimer [[id number]]  (Normal) Collected:  07/31/18 1801    Lab Status:  Final result Specimen:  Blood from Arm, Right Updated:  07/31/18 1827     D-Dimer, Quant 410 ng/ml (FEU)     CBC and differential [[id number]]  (Abnormal) Collected:  07/31/18 1801    Lab Status:  Final result Specimen:  Blood from Arm, Right Updated:  07/31/18 1811     WBC 11 19 (H) Thousand/uL      RBC 4 58 Million/uL      Hemoglobin 12 7 g/dL      Hematocrit 39 1 %      MCV 85 fL      MCH 27 7 pg      MCHC 32 5 g/dL      RDW 14 0 %      MPV 10 8 fL      Platelets 975 (H) Thousands/uL      nRBC 0 /100 WBCs      Neutrophils Relative 77 (H) %      Immat GRANS % 0 %      Lymphocytes Relative 14 %      Monocytes Relative 8 %      Eosinophils Relative 1 %      Basophils Relative 0 %      Neutrophils Absolute 8 57 (H) Thousands/µL      Immature Grans Absolute 0 03 Thousand/uL      Lymphocytes Absolute 1 55 Thousands/µL      Monocytes Absolute 0 86 Thousand/µL      Eosinophils Absolute 0 13 Thousand/µL      Basophils Absolute 0 05 Thousands/µL                  XR chest 2 views   ED Interpretation by Colby Dan MD (07/31 1813)   No acute findings      Final Result by Ashanti Lu MD (07/31 1951)      No acute cardiopulmonary disease  Mild bibasilar atelectasis with no focal infiltrate or pleural effusion  Workstation performed: [id number]                    Procedures  ECG 12 Lead Documentation  Date/Time: 7/31/2018 5:44 PM  Performed by: Adriana Mejia by: Tess Vásquez     ECG reviewed by me, the ED Provider: yes    Patient location:  ED  Previous ECG:     Previous ECG:  Unavailable  Interpretation:     Interpretation: non-specific    Rate:     ECG rate:  103    ECG rate assessment: tachycardic    Rhythm:     Rhythm: sinus tachycardia    Ectopy:     Ectopy: none    QRS:     QRS axis:  Normal  Conduction:     Conduction: normal    ST segments:     ST segments:  Non-specific  T waves:     T waves: non-specific             Phone Contacts  ED Phone Contact    ED Course  ED Course as of Aug 05 1610   Tue Jul 31, 2018   1827 D-DIMER QUANTITATIVE: 410   1845 Pt says pain improved  I will dc with f/u to pcp  Return precuations discussed             HEART Risk Score      Most Recent Value   History  0 Filed at: 07/31/2018 1854   ECG  0 Filed at: 07/31/2018 1854   Age  0 Filed at: 07/31/2018 1854   Risk Factors  0 Filed at: 07/31/2018 1854   Troponin  0 Filed at: 07/31/2018 1854   Heart Score Risk Calculator   History  0 Filed at: 07/31/2018 1854   ECG  0 Filed at: 07/31/2018 1854   Age  0 Filed at: 07/31/2018 1854   Risk Factors  0 Filed at: 07/31/2018 1854   Troponin  0 Filed at: 07/31/2018 1854   HEART Score  0 Filed at: 07/31/2018 1854   HEART Score  0 Filed at: 07/31/2018 1854                            MDM  CritCare Time    Disposition  Final diagnoses:   Chest pain   Tobacco abuse     Time reflects when diagnosis was documented in both MDM as applicable and the Disposition within this note     Time User Action Codes Description Comment    7/31/2018  6:52 PM Trude Baptise T Add [R07 9] Chest pain     7/31/2018  6:53 PM Trude Baptise T Add [Z72 0] Tobacco abuse       ED Disposition     ED Disposition Condition Comment    Discharge  [place] discharge to home/self care  Condition at discharge: Good        Follow-up Information     Follow up With Specialties Details Why Contact Info [address] Internal Medicine In 2 days  [facility] [phone]       [facility] Emergency Department Emergency Medicine  If symptoms worsen [address]  [phone] [facility] ED, [address], [place], [place], [address]          Discharge Medication List as of 7/31/2018  6:54 PM      START taking these medications    Details   acetaminophen (TYLENOL) 500 mg tablet Take 1 tablet (500 mg total) by mouth every 6 (six) hours as needed for mild pain, Starting Tue 7/31/2018, Normal      !! naproxen (NAPROSYN) 500 mg tablet Take 1 tablet (500 mg total) by mouth 2 (two) times a day with meals, Starting Tue 7/31/2018, Normal       !! - Potential duplicate medications found  Please discuss with provider        CONTINUE these medications which have NOT CHANGED    Details   lamoTRIgine (LaMICtal) 25 mg tablet Take 25 mg by mouth, Starting Tue 8/1/2017, Until Wed 8/1/2018, Historical Med      !! naproxen (NAPROSYN) 500 mg tablet Take 1 tablet (500 mg total) by mouth 2 (two) times a day with meals, Starting Fri 5/4/2018, Print       !! - Potential duplicate medications found  Please discuss with provider  No discharge procedures on file      ED Provider  Electronically Signed by           Mati Rocha MD  08/05/18 6790

## 2018-07-31 NOTE — DISCHARGE INSTRUCTIONS
Chest Pain, Ambulatory Care   GENERAL INFORMATION:   Chest pain  can be caused by a range of conditions, from not serious to life-threatening  It may be caused by a heart attack or a blood clot in your lungs  Sometimes chest pain or pressure is caused by poor blood flow to your heart (angina)  Infection, inflammation, or a fracture in the bones or cartilage in your chest can cause pain or discomfort  Chest pain can also be a symptom of a digestive problem, such as acid reflux or a stomach ulcer  Common symptoms include the following:   · Fever or sweating     · Nausea or vomiting     · Shortness of breath     · Discomfort or pressure that spreads from your chest to your back, jaw, or arm     · A racing or slow heartbeat     · Feeling weak, tired, or faint  Seek immediate care for the following symptoms:   · Any of the following signs of a heart attack:      ¨ Squeezing, pressure, or pain in your chest that lasts longer than 5 minutes or returns    ¨ Discomfort or pain in your back, neck, jaw, stomach, or arm     ¨ Trouble breathing     ¨ Nausea or vomiting    ¨ Lightheadedness or a sudden cold sweat, especially with trouble breathing         · Chest discomfort that gets worse, even with medicine    · Coughing or vomiting blood    · Black or bloody bowel movements     · Vomiting that does not stop, or pain when you swallow  Treatment for chest pain  may include medicine to treat your symptoms while he determines the cause of your chest pain  You may also need any of the following:  · Antiplatelets , such as aspirin, help prevent blood clots  Take your antiplatelet medicine exactly as directed  These medicines make it more likely for you to bleed or bruise  If you are told to take aspirin, do not take acetaminophen or ibuprofen instead  · Prescription pain medicine  may be given  Ask how to take this medicine safely  Do not smoke: If you smoke, it is never too late to quit   Smoking increases your risk for a heart attack and other heart and lung conditions  Ask your healthcare provider for information about how to stop smoking if you need help  Follow up with your healthcare provider as directed: You may need more tests  You may be referred to a specialist, such as a cardiologist or gastroenterologist  Write down your questions so you remember to ask them during your visits  CARE AGREEMENT:   You have the right to help plan your care  Learn about your health condition and how it may be treated  Discuss treatment options with your caregivers to decide what care you want to receive  You always have the right to refuse treatment  The above information is an  only  It is not intended as medical advice for individual conditions or treatments  Talk to your doctor, nurse or pharmacist before following any medical regimen to see if it is safe and effective for you  © 2014 [address] is for End User's use only and may not be sold, redistributed or otherwise used for commercial purposes  All illustrations and images included in CareNotes® are the copyrighted property of A D A M , Inc  or [employer]  Cigarette Smoking and Your Health, Ambulatory Care   American Heart Association: How Cigarettes Damage Your Body  American Heart Association  [place], [address] [place] [address]  Available from URL: Tl garcia za  As accessed 2013-10-02  Sweetie Fiore RM: Exposure to tobacco smoke causes immediate damage: a report of the Surgeon [address]; 126(2):158-159  Joe GALINDO & Evelyn WS: Perioperative smoking risk  Anesthesiology 2011; 114(4):734-736  [facility]: Harms of Smoking and Health Benefits of Quitting  [address] [facility], MD  2011  Available from URL: HÃ¶vding com ee   As accessed 2013-10-02  Derian RE, Maico PM, & Meridian SA: Health effects of light and intermittent smoking: a review  Circulation 2010; 121(13):0897-8035  Sada DECKER: Tobacco smoke exposure and chronic conditions of childhood  Pediatrics 2010; 126(1):s067-x061  © 2014 [address] is for End User's use only and may not be sold, redistributed or otherwise used for commercial purposes  All illustrations and images included in CareNotes® are the copyrighted property of A D A [employer] , [employer]  or Steve Monroe  The above information is an  only  It is not intended as medical advice for individual conditions or treatments  Talk to your doctor, nurse or pharmacist before following any medical regimen to see if it is safe and effective for you

## 2018-08-01 LAB
ATRIAL RATE: 109 BPM
P AXIS: 73 DEGREES
PR INTERVAL: 140 MS
QRS AXIS: 61 DEGREES
QRSD INTERVAL: 86 MS
QT INTERVAL: 302 MS
QTC INTERVAL: 396 MS
T WAVE AXIS: 34 DEGREES
VENTRICULAR RATE: 103 BPM

## 2018-08-01 PROCEDURE — 93010 ELECTROCARDIOGRAM REPORT: CPT | Performed by: INTERNAL MEDICINE

## 2018-09-11 ENCOUNTER — APPOINTMENT (EMERGENCY)
Dept: RADIOLOGY | Facility: HOSPITAL | Age: 23
End: 2018-09-11
Payer: COMMERCIAL

## 2018-09-11 ENCOUNTER — HOSPITAL ENCOUNTER (EMERGENCY)
Facility: HOSPITAL | Age: 23
Discharge: HOME | End: 2018-09-11
Attending: EMERGENCY MEDICINE | Admitting: EMERGENCY MEDICINE
Payer: COMMERCIAL

## 2018-09-11 VITALS
DIASTOLIC BLOOD PRESSURE: 59 MMHG | OXYGEN SATURATION: 98 % | TEMPERATURE: 98.5 F | SYSTOLIC BLOOD PRESSURE: 121 MMHG | RESPIRATION RATE: 18 BRPM | BODY MASS INDEX: 30.48 KG/M2 | HEART RATE: 86 BPM | WEIGHT: 166.67 LBS

## 2018-09-11 DIAGNOSIS — S30.0XXA CONTUSION OF LOWER BACK, INITIAL ENCOUNTER: Primary | ICD-10-CM

## 2018-09-11 PROCEDURE — 99283 EMERGENCY DEPT VISIT LOW MDM: CPT

## 2018-09-11 PROCEDURE — 72100 X-RAY EXAM L-S SPINE 2/3 VWS: CPT

## 2018-09-11 RX ORDER — LIDOCAINE 50 MG/G
1 PATCH TOPICAL ONCE
Status: DISCONTINUED | OUTPATIENT
Start: 2018-09-11 | End: 2018-09-11 | Stop reason: HOSPADM

## 2018-09-11 RX ORDER — ACETAMINOPHEN 325 MG/1
650 TABLET ORAL ONCE
Status: COMPLETED | OUTPATIENT
Start: 2018-09-11 | End: 2018-09-11

## 2018-09-11 RX ORDER — IBUPROFEN 400 MG/1
400 TABLET ORAL ONCE
Status: COMPLETED | OUTPATIENT
Start: 2018-09-11 | End: 2018-09-11

## 2018-09-11 RX ADMIN — ACETAMINOPHEN 650 MG: 325 TABLET, FILM COATED ORAL at 13:17

## 2018-09-11 RX ADMIN — IBUPROFEN 400 MG: 400 TABLET ORAL at 13:18

## 2018-09-11 RX ADMIN — LIDOCAINE 1 PATCH: 50 PATCH TOPICAL at 13:19

## 2018-09-11 NOTE — ED NOTES
Pt  Discharged, no questions at time of dispo  Pt  Awake and alert, no distress at time of dispo        Nash Gonsales RN  09/11/18 9502

## 2018-09-11 NOTE — DISCHARGE INSTRUCTIONS
Diagnosis:  Low back contusion     - activity as tolerated     - pain should resolve over next 2-3 weeks-  s might get worse before getting better- but should improve over time    - the lidocaine patch needs to be removed in 12 hrs- can use over the counter lidocaine patches to area as needed     - for pain- would take both over the counter generic ibuprofen 400 mg - taken together with over the counter generic tylenol 500 mg - 4 times a day with meals/ liquids    - please return to  The er for any  Foot/leg weakness/ any inability to move or control your urine/stools / any numbness of your saddle area  Or any new/ worsening/concerning symptoms to you    - if no improvement after 2 weeks please call  The spine/ pain center to schedule an apopointment

## 2018-09-14 NOTE — ED PROVIDER NOTES
History  Chief Complaint   Patient presents with    Back Pain     Pt reports lower back pain since moving furniture on 9/9       20 yr male states 2 days carrying heavy object down stairs and fell onto low back with object-- c/o low back pain worse upon rom -- no radiation of pain -- no b/b incont/ no saddle anesthesia- no ble weakness/senosry comps        History provided by:  Patient   used: No    Back Pain       Prior to Admission Medications   Prescriptions Last Dose Informant Patient Reported? Taking?   acetaminophen (TYLENOL) 500 mg tablet   No No   Sig: Take 1 tablet (500 mg total) by mouth every 6 (six) hours as needed for mild pain   lamoTRIgine (LaMICtal) 25 mg tablet   Yes No   Sig: Take 25 mg by mouth   naproxen (NAPROSYN) 500 mg tablet   No No   Sig: Take 1 tablet (500 mg total) by mouth 2 (two) times a day with meals   naproxen (NAPROSYN) 500 mg tablet Past Month at Unknown time  No Yes   Sig: Take 1 tablet (500 mg total) by mouth 2 (two) times a day with meals      Facility-Administered Medications: None       Past Medical History:   Diagnosis Date    ADHD (attention deficit hyperactivity disorder)     Epilepsy ([facility] [address] )     Suicide attempt ([facility] )        History reviewed  No pertinent surgical history  History reviewed  No pertinent family history  I have reviewed and agree with the history as documented  Social History   Substance Use Topics    Smoking status: Current Every Day Smoker     Packs/day: 0 50    Smokeless tobacco: Never Used    Alcohol use No        Review of Systems   Constitutional: Negative  HENT: Negative  Eyes: Negative  Respiratory: Negative  Cardiovascular: Negative  Gastrointestinal: Negative  Endocrine: Negative  Genitourinary: Negative  Musculoskeletal: Positive for back pain  Negative for arthralgias, gait problem, joint swelling, myalgias, neck pain and neck stiffness  Skin: Negative  Allergic/Immunologic: Negative  Neurological: Negative  Hematological: Negative  Psychiatric/Behavioral: Negative  Physical Exam  Physical Exam   Constitutional: He is oriented to person, place, and time  No distress  avss-- pulse ox 98 % on ra- interpretation is normal- no intervention    HENT:   Head: Normocephalic and atraumatic  Eyes: Conjunctivae and EOM are normal  Pupils are equal, round, and reactive to light  Right eye exhibits no discharge  Left eye exhibits no discharge  No scleral icterus  Mm pink   Neck: Normal range of motion  Neck supple  No JVD present  No tracheal deviation present  No thyromegaly present  No pmt c spine   Cardiovascular: Normal rate, regular rhythm, normal heart sounds and intact distal pulses  Exam reveals no gallop and no friction rub  No murmur heard  Pulmonary/Chest: Effort normal and breath sounds normal  No stridor  No respiratory distress  He has no wheezes  He has no rales  He exhibits no tenderness  Abdominal: Soft  Bowel sounds are normal  He exhibits no distension and no mass  There is no tenderness  There is no rebound and no guarding  Musculoskeletal: Normal range of motion  He exhibits tenderness  He exhibits no edema or deformity  Pos lower pmt l spine tenderness-- no steppoff-- equal bilateral radial/dp pulses- no ble edema/calf tenderness/assym/ erythema   Lymphadenopathy:     He has no cervical adenopathy  Neurological: He is alert and oriented to person, place, and time  He displays normal reflexes  No cranial nerve deficit or sensory deficit  He exhibits normal muscle tone  Coordination normal    Skin: Skin is warm  Capillary refill takes less than 2 seconds  No rash noted  He is not diaphoretic  No erythema  No pallor  Psychiatric: He has a normal mood and affect  His behavior is normal    Nursing note and vitals reviewed        Vital Signs  ED Triage Vitals [09/11/18 1227]   Temperature Pulse Respirations Blood Pressure SpO2   98 5 °F (36 9 °C) 92 18 121/59 98 %      Temp Source Heart Rate Source Patient Position - Orthostatic VS BP Location FiO2 (%)   Oral Monitor Sitting Right arm --      Pain Score       9           Vitals:    09/11/18 1227 09/11/18 1230   BP: 121/59 121/59   Pulse: 92 86   Patient Position - Orthostatic VS: Sitting        Visual Acuity      ED Medications  Medications   ibuprofen (MOTRIN) tablet 400 mg (400 mg Oral Given 9/11/18 1318)   acetaminophen (TYLENOL) tablet 650 mg (650 mg Oral Given 9/11/18 1317)       Diagnostic Studies  Results Reviewed     None                 XR lumbar spine 2 or 3 views   Final Result by Norris Lee MD (09/11 1353)      Normal examination  Workstation performed: [id number]                    Procedures  Procedures       Phone Contacts  ED Phone Contact    ED Course  ED Course as of Sep 14 1259   Tue Sep 11, 2018   1411 L/s spine to  lower t spine- no fx seen                                 MDM  CritCare Time    Disposition  Final diagnoses:   Contusion of lower back, initial encounter     Time reflects when diagnosis was documented in both MDM as applicable and the Disposition within this note     Time User Action Codes Description Comment    9/11/2018  2:13 PM Dominick Go Add [S30  0XXA] Contusion of lower back, initial encounter       ED Disposition     ED Disposition Condition Comment    Discharge  Villa Kelly discharge to home/self care  Condition at discharge: Good        Follow-up Information     Follow up With Specialties Details Why Contact Info Additional Information    [facility]   [address]  [phone]  [address] [facility], [address] [place], [place], [facility]  Please report to [address] located on the 2nd floor to check in, however if your appointment is for an EMG, please register in Out Patient Registration located on the 1st floor            Discharge Medication List as of 9/11/2018  2:18 PM      CONTINUE these medications which have NOT CHANGED    Details   !! naproxen (NAPROSYN) 500 mg tablet Take 1 tablet (500 mg total) by mouth 2 (two) times a day with meals, Starting Tue 7/31/2018, Normal      acetaminophen (TYLENOL) 500 mg tablet Take 1 tablet (500 mg total) by mouth every 6 (six) hours as needed for mild pain, Starting Tue 7/31/2018, Normal      lamoTRIgine (LaMICtal) 25 mg tablet Take 25 mg by mouth, Starting Tue 8/1/2017, Until Wed 8/1/2018, Historical Med      !! naproxen (NAPROSYN) 500 mg tablet Take 1 tablet (500 mg total) by mouth 2 (two) times a day with meals, Starting Fri 5/4/2018, Print       !! - Potential duplicate medications found  Please discuss with provider  No discharge procedures on file      ED Provider  Electronically Signed by           Debbie Castro MD  09/14/18 4404

## 2019-01-03 ENCOUNTER — HOSPITAL ENCOUNTER (EMERGENCY)
Facility: HOSPITAL | Age: 24
Discharge: HOME | End: 2019-01-04
Attending: EMERGENCY MEDICINE | Admitting: EMERGENCY MEDICINE

## 2019-01-03 VITALS
SYSTOLIC BLOOD PRESSURE: 122 MMHG | RESPIRATION RATE: 18 BRPM | OXYGEN SATURATION: 98 % | TEMPERATURE: 100.4 F | HEART RATE: 103 BPM | DIASTOLIC BLOOD PRESSURE: 69 MMHG

## 2019-01-03 DIAGNOSIS — J40 BRONCHITIS: Primary | ICD-10-CM

## 2019-01-03 PROCEDURE — 99284 EMERGENCY DEPT VISIT MOD MDM: CPT

## 2019-01-04 ENCOUNTER — APPOINTMENT (EMERGENCY)
Dept: RADIOLOGY | Facility: HOSPITAL | Age: 24
End: 2019-01-04

## 2019-01-04 LAB
FLUAV AG SPEC QL IA: NEGATIVE
FLUAV AG SPEC QL: NORMAL
FLUBV AG SPEC QL IA: NEGATIVE
FLUBV AG SPEC QL: NORMAL
RSV B RNA SPEC QL NAA+PROBE: NORMAL

## 2019-01-04 PROCEDURE — 71046 X-RAY EXAM CHEST 2 VIEWS: CPT

## 2019-01-04 PROCEDURE — 87631 RESP VIRUS 3-5 TARGETS: CPT | Performed by: PHYSICIAN ASSISTANT

## 2019-01-04 RX ORDER — ALBUTEROL SULFATE 2.5 MG/3ML
2.5 SOLUTION RESPIRATORY (INHALATION) ONCE
Status: COMPLETED | OUTPATIENT
Start: 2019-01-04 | End: 2019-01-04

## 2019-01-04 RX ORDER — ALBUTEROL SULFATE 2.5 MG/3ML
5 SOLUTION RESPIRATORY (INHALATION) ONCE
Status: COMPLETED | OUTPATIENT
Start: 2019-01-04 | End: 2019-01-04

## 2019-01-04 RX ORDER — ACETAMINOPHEN 325 MG/1
650 TABLET ORAL ONCE
Status: COMPLETED | OUTPATIENT
Start: 2019-01-04 | End: 2019-01-04

## 2019-01-04 RX ORDER — ALBUTEROL SULFATE 90 UG/1
1-2 AEROSOL, METERED RESPIRATORY (INHALATION) EVERY 6 HOURS PRN
Qty: 1 INHALER | Refills: 0 | Status: SHIPPED | OUTPATIENT
Start: 2019-01-04

## 2019-01-04 RX ORDER — AZITHROMYCIN 250 MG/1
500 TABLET, FILM COATED ORAL ONCE
Status: COMPLETED | OUTPATIENT
Start: 2019-01-04 | End: 2019-01-04

## 2019-01-04 RX ORDER — PREDNISONE 10 MG/1
TABLET ORAL
Qty: 21 TABLET | Refills: 0 | Status: SHIPPED | OUTPATIENT
Start: 2019-01-04

## 2019-01-04 RX ORDER — AZITHROMYCIN 250 MG/1
TABLET, FILM COATED ORAL
Qty: 6 TABLET | Refills: 0 | Status: SHIPPED | OUTPATIENT
Start: 2019-01-04 | End: 2019-01-08

## 2019-01-04 RX ADMIN — ACETAMINOPHEN 650 MG: 325 TABLET, FILM COATED ORAL at 01:02

## 2019-01-04 RX ADMIN — IPRATROPIUM BROMIDE 0.5 MG: 0.5 SOLUTION RESPIRATORY (INHALATION) at 01:03

## 2019-01-04 RX ADMIN — PREDNISONE 50 MG: 20 TABLET ORAL at 01:59

## 2019-01-04 RX ADMIN — AZITHROMYCIN 500 MG: 250 TABLET, FILM COATED ORAL at 01:57

## 2019-01-04 RX ADMIN — ALBUTEROL SULFATE 2.5 MG: 2.5 SOLUTION RESPIRATORY (INHALATION) at 01:57

## 2019-01-04 RX ADMIN — ALBUTEROL SULFATE 5 MG: 2.5 SOLUTION RESPIRATORY (INHALATION) at 01:03

## 2019-01-04 NOTE — ED PROVIDER NOTES
History  Chief Complaint   Patient presents with    Flu Symptoms     pt reports starting 4 days ago with fever, chills, congestion, sore throat, and generalized body aches     19 y/o M presents to ED c/o Flu Like Symptoms  Pt reports initial onset of fever, chills, congestion, cough, headache, body aches  Pt reports these symptoms onset abruptly 4 days ago, since has increased fatigue, difficulty breathing  Pt reports Hx of cigarette smoking, states he smokes about a pack per day for the past 6 years, though has reduced his smoking to 3-4 cigarettes per day due to recent illness  Pt reports he has been drinking fluids, eating, took tylenol yesterday with some relief  Denies CP, SOB, n/v/d, rashes, and no other complaints at this time  History provided by:  Patient   used: No    Flu Symptoms   Presenting symptoms: cough, fatigue, fever, headache and myalgias    Presenting symptoms: no diarrhea, no nausea, no rhinorrhea, no shortness of breath, no sore throat and no vomiting    Severity:  Moderate  Onset quality:  Sudden  Progression:  Worsening  Chronicity:  New  Relieved by:  Nothing  Worsened by:  Nothing  Ineffective treatments:  OTC medications  Associated symptoms: chills, decreased appetite and nasal congestion    Associated symptoms: no decrease in physical activity, no ear pain, no mental status change, no neck stiffness and no syncope    Risk factors: not elderly, no diabetes problem, no heart disease, no immunocompromised state, no kidney disease and no liver disease        Prior to Admission Medications   Prescriptions Last Dose Informant Patient Reported?  Taking?   lamoTRIgine (LaMICtal) 25 mg tablet   Yes Yes   Sig: Take 25 mg by mouth   naproxen (NAPROSYN) 500 mg tablet   No Yes   Sig: Take 1 tablet (500 mg total) by mouth 2 (two) times a day with meals      Facility-Administered Medications: None       Past Medical History:   Diagnosis Date    ADHD (attention deficit hyperactivity disorder)     Epilepsy ([facility] [address] )     Suicide attempt [facility])        History reviewed  No pertinent surgical history  History reviewed  No pertinent family history  I have reviewed and agree with the history as documented  Social History   Substance Use Topics    Smoking status: Current Every Day Smoker     Packs/day: 0 50    Smokeless tobacco: Never Used    Alcohol use No        Review of Systems   Constitutional: Positive for chills, decreased appetite, fatigue and fever  Negative for activity change, appetite change and diaphoresis  HENT: Positive for congestion  Negative for ear pain, rhinorrhea, sinus pain, sinus pressure, sore throat, tinnitus, trouble swallowing and voice change  Respiratory: Positive for cough  Negative for apnea, choking, chest tightness, shortness of breath and stridor  Cardiovascular: Negative for chest pain and palpitations  Gastrointestinal: Negative for abdominal pain, diarrhea, nausea and vomiting  Genitourinary: Negative for difficulty urinating and dysuria  Musculoskeletal: Positive for myalgias  Negative for neck stiffness  Neurological: Positive for headaches  Negative for dizziness, tremors, weakness, light-headedness and numbness  Physical Exam  Physical Exam   Constitutional: He is oriented to person, place, and time  He appears well-developed and well-nourished  No distress  HENT:   Head: Normocephalic and atraumatic  Right Ear: External ear normal    Left Ear: External ear normal    Mouth/Throat: Uvula is midline and mucous membranes are normal  No trismus in the jaw  Posterior oropharyngeal erythema present  No oropharyngeal exudate, posterior oropharyngeal edema or tonsillar abscesses  Nasal congestion   Eyes: Pupils are equal, round, and reactive to light  Conjunctivae are normal  Right eye exhibits no discharge  Left eye exhibits no discharge  Neck: Normal range of motion  Neck supple  No JVD present   No tracheal deviation present  Cardiovascular: Normal rate, regular rhythm, normal heart sounds and intact distal pulses  Exam reveals no gallop and no friction rub  No murmur heard  Pulmonary/Chest: Effort normal  No accessory muscle usage or stridor  No apnea and no tachypnea  No respiratory distress  He has no decreased breath sounds  He has wheezes  He has rhonchi in the right lower field  He has no rales  He exhibits no tenderness  Abdominal: Soft  Bowel sounds are normal  He exhibits no distension and no mass  There is no tenderness  There is no rebound and no guarding  No hernia  Lymphadenopathy:     He has no cervical adenopathy  Neurological: He is alert and oriented to person, place, and time  Skin: Skin is warm  Capillary refill takes less than 2 seconds  No erythema  Nursing note and vitals reviewed        Vital Signs  ED Triage Vitals [01/03/19 2145]   Temperature Pulse Respirations Blood Pressure SpO2   100 4 °F (38 °C) 103 18 122/69 98 %      Temp Source Heart Rate Source Patient Position - Orthostatic VS BP Location FiO2 (%)   Oral Monitor Sitting Left arm --      Pain Score       8           Vitals:    01/03/19 2145   BP: 122/69   Pulse: 103   Patient Position - Orthostatic VS: Sitting       Visual Acuity      ED Medications  Medications   albuterol inhalation solution 5 mg (5 mg Nebulization Given 1/4/19 0103)   ipratropium (ATROVENT) 0 02 % inhalation solution 0 5 mg (0 5 mg Nebulization Given 1/4/19 0103)   acetaminophen (TYLENOL) tablet 650 mg (650 mg Oral Given 1/4/19 0102)   azithromycin (ZITHROMAX) tablet 500 mg (500 mg Oral Given 1/4/19 0157)   albuterol inhalation solution 2 5 mg (2 5 mg Nebulization Given 1/4/19 0157)       Diagnostic Studies  Results Reviewed     Procedure Component Value Units Date/Time    Rapid Influenza Screen with Reflex PCR [[id number]]  (Normal) Collected:  01/04/19 0059    Lab Status:  Final result Specimen:  Nasopharyngeal from Nasopharyngeal Swab Updated:  01/04/19 0122 Rapid Influenza A Ag Negative     Rapid Influenza B Ag Negative    INFLUENZA A/B AND RSV, PCR [[id number]] Collected:  01/04/19 0059    Lab Status: In process Specimen:  Nasopharyngeal from Nasopharyngeal Swab Updated:  01/04/19 0122                 XR chest 2 views   Final Result by Dotty Otto MD (01/04 0848)      No acute cardiopulmonary disease  Workstation performed: [id number]                    Procedures  Procedures       Phone Contacts  ED Phone Contact    ED Course                               MDM  Number of Diagnoses or Management Options  Bronchitis: new and requires workup  Diagnosis management comments: 21 y/o M presents to ED c/o Flu Like Symptoms  Pt reports initial onset of fever, chills, congestion, cough, headache, body aches  Pt reports these symptoms onset abruptly 4 days ago, since has increased fatigue, difficulty breathing  Pt reports Hx of cigarette smoking, states he smokes about a pack per day for the past 6 years, though has reduced his smoking to 3-4 cigarettes per day due to recent illness  Pt reports he has been drinking fluids, eating, took tylenol yesterday with some relief  Denies CP, SOB, n/v/d, rashes, and no other complaints at this time  VS reviewed and WNL  Exam reveals nasal congesiton, wheezes throughout all lung fields, rhonchi in RLF, posterior oropharynx erythema, ill appearing but nontoxic, and no other significant findings  Will give duoneb, check XR,  Reassess  Reassessed and improved, will give additional neb, prednisone, 1st dose azithromycin  Pt sts he has no ride home, rode bike, will attempt to get pt transportation  Pt states he cannot leave as he doesn't want to leave bike here, will bike home as he cannot find another ride  Scripts provided for predinsone taper, azithromycin  Recommended home care of fluids, rest, tylenol for fever  Return to Ed instructions given regarding new/worsening sxs             Amount and/or Complexity of Data Reviewed  Tests in the radiology section of CPT®: ordered and reviewed  Tests in the medicine section of CPT®: ordered and reviewed  Review and summarize past medical records: yes    Risk of Complications, Morbidity, and/or Mortality  Presenting problems: moderate  Diagnostic procedures: moderate  Management options: moderate    Patient Progress  Patient progress: improved    CritCare Time    Disposition  Final diagnoses:   Bronchitis     Time reflects when diagnosis was documented in both MDM as applicable and the Disposition within this note     Time User Action Codes Description Comment    1/4/2019  2:01 AM Magdalene Givens Add [J06 9,  B97 89] Viral URI with cough     1/4/2019  2:01 AM Ashley Oseguera [J06 9,  B97 89] Viral URI with cough     1/4/2019  2:01 AM Magdalene Givens Add [J40] Bronchitis       ED Disposition     ED Disposition Condition Comment    Discharge  Villa Kelly discharge to home/self care      Condition at discharge: Good        Follow-up Information     Follow up With Specialties Details Why Contact Info Additional [address] Internal Medicine  As needed [facility] [phone]       [facility] Emergency Department Emergency Medicine  If symptoms worsen [address]  [phone] AN ED, [address], [place], [place], [address]          Discharge Medication List as of 1/4/2019  2:05 AM      START taking these medications    Details   albuterol (PROVENTIL HFA,VENTOLIN HFA) 90 mcg/act inhaler Inhale 1-2 puffs every 6 (six) hours as needed for wheezing, Starting Fri 1/4/2019, Print      azithromycin (ZITHROMAX) 250 mg tablet Take 2 tablets today then 1 tablet daily x 4 days, Print      predniSONE 10 mg tablet 2 tab twice a day for 3 days, 1 tab twice a day for 3 days, 1 tab a day for three days, Print         CONTINUE these medications which have NOT CHANGED    Details lamoTRIgine (LaMICtal) 25 mg tablet Take 25 mg by mouth, Starting Tue 8/1/2017, Until Fri 1/4/2019, Historical Med      naproxen (NAPROSYN) 500 mg tablet Take 1 tablet (500 mg total) by mouth 2 (two) times a day with meals, Starting Fri 5/4/2018, Print           No discharge procedures on file      ED Provider  Electronically Signed by           Kelley Ricardo PA-C  01/04/19 8559

## 2019-01-04 NOTE — DISCHARGE INSTRUCTIONS
Acute Bronchitis   WHAT YOU NEED TO KNOW:   Acute bronchitis is swelling and irritation in the air passages of your lungs  This irritation may cause you to cough or have other breathing problems  Acute bronchitis often starts because of another illness, such as a cold or the flu  The illness spreads from your nose and throat to your windpipe and airways  Bronchitis is often called a chest cold  Acute bronchitis lasts about 3 to 6 weeks and is usually not a serious illness  Your cough can last for several weeks  DISCHARGE INSTRUCTIONS:   Return to the emergency department if:   · You cough up blood  · Your lips or fingernails turn blue  · You feel like you are not getting enough air when you breathe  Contact your healthcare provider if:   · You have a fever  · Your breathing problems do not go away or get worse  · Your cough does not get better within 4 weeks  · You have questions or concerns about your condition or care  Self-care:   · Get more rest   Rest helps your body to heal  Slowly start to do more each day  Rest when you feel it is needed  · Avoid irritants in the air  Avoid chemicals, fumes, and dust  Wear a face mask if you must work around dust or fumes  Stay inside on days when air pollution levels are high  If you have allergies, stay inside when pollen counts are high  Do not use aerosol products, such as spray-on deodorant, bug spray, and hair spray  · Do not smoke or be around others who smoke  Nicotine and other chemicals in cigarettes and cigars damages the cilia that move mucus out of your lungs  Ask your healthcare provider for information if you currently smoke and need help to quit  E-cigarettes or smokeless tobacco still contain nicotine  Talk to your healthcare provider before you use these products  · Drink liquids as directed  Liquids help keep your air passages moist and help you cough up mucus   You may need to drink more liquids when you have acute bronchitis  Ask how much liquid to drink each day and which liquids are best for you  · Use a humidifier or vaporizer  Use a cool mist humidifier or a vaporizer to increase air moisture in your home  This may make it easier for you to breathe and help decrease your cough  Decrease risk for acute bronchitis:   · Get the vaccinations you need  Ask your healthcare provider if you should get vaccinated against the flu or pneumonia  · Prevent the spread of germs  You can decrease your risk of acute bronchitis and other illnesses by doing the following:     [facility] your hands often with soap and water  Carry germ-killing hand lotion or gel with you  You can use the lotion or gel to clean your hands when soap and water are not available  ¨ Do not touch your eyes, nose, or mouth unless you have washed your hands first     ¨ Always cover your mouth when you cough to prevent the spread of germs  It is best to cough into a tissue or your shirt sleeve instead of into your hand  Ask those around you cover their mouths when they cough  ¨ Try to avoid people who have a cold or the flu  If you are sick, stay away from others as much as possible  Medicines: Your healthcare provider may  give you any of the following:  · Ibuprofen or acetaminophen  are medicines that help lower your fever  They are available without a doctor's order  Ask your healthcare provider which medicine is right for you  Ask how much to take and how often to take it  Follow directions  These medicines can cause stomach bleeding if not taken correctly  Ibuprofen can cause kidney damage  Do not take ibuprofen if you have kidney disease, an ulcer, or allergies to aspirin  Acetaminophen can cause liver damage  Do not take more than 4,000 milligrams in 24 hours  · Decongestants  help loosen mucus in your lungs and make it easier to cough up  This can help you breathe easier  · Cough suppressants  decrease your urge to cough   If your cough produces mucus, do not take a cough suppressant unless your healthcare provider tells you to  Your healthcare provider may suggest that you take a cough suppressant at night so you can rest     · Inhalers  may be given  Your healthcare provider may give you one or more inhalers to help you breathe easier and cough less  An inhaler gives your medicine to open your airways  Ask your healthcare provider to show you how to use your inhaler correctly  · Take your medicine as directed  Contact your healthcare provider if you think your medicine is not helping or if you have side effects  Tell him of her if you are allergic to any medicine  Keep a list of the medicines, vitamins, and herbs you take  Include the amounts, and when and why you take them  Bring the list or the pill bottles to follow-up visits  Carry your medicine list with you in case of an emergency  Follow up with your healthcare provider as directed:  Write down questions you have so you will remember to ask them during your follow-up visits  © 2017 [employer] Information is for End User's use only and may not be sold, redistributed or otherwise used for commercial purposes  All illustrations and images included in CareNotes® are the copyrighted property of [employer]  or [employer]  The above information is an  only  It is not intended as medical advice for individual conditions or treatments  Talk to your doctor, nurse or pharmacist before following any medical regimen to see if it is safe and effective for you